# Patient Record
Sex: MALE | URBAN - METROPOLITAN AREA
[De-identification: names, ages, dates, MRNs, and addresses within clinical notes are randomized per-mention and may not be internally consistent; named-entity substitution may affect disease eponyms.]

---

## 2023-03-16 ENCOUNTER — TELEPHONE (OUTPATIENT)
Dept: NEPHROLOGY | Facility: CLINIC | Age: 83
End: 2023-03-16

## 2023-03-16 NOTE — TELEPHONE ENCOUNTER
New Patient Intake Form   Patient Details   Sivan No     1940     [id number]     Insurance Information   Name of [address]   Does the patient need an insurance referral? no   If patient has Isidoro Diaz, please ask if they will be using their Isidoro Diaz  Appointment Information   Who is calling to schedule? If not patient, what is callers name? Patient   Referring Provider  Dr Craig Don   Reason for Appt (Diagnosis) Cyst on each kidney   Does Patient have labs/urine done at [facility]? If not, where do they go? List the date of last lab / urine No [facility]  Per patient, Sometime last week     Has patient been hospitalized recently? If yes, list name and location of hospital they were in no   Has patient been seen by a Nephrologist before? If yes, list name, location and phone number no   Has the patient had renal imaging done? If so, list the most recent date and type of imaging yes a year ago   Does patient have a history of Kidney Stones?  No but had them in the past   Appointment Details   Is there a referral on file? no    Appointment Date 6/1/23   Location  [place]   Miscellaneous

## 2023-05-03 ENCOUNTER — TELEPHONE (OUTPATIENT)
Dept: NEPHROLOGY | Facility: CLINIC | Age: 83
End: 2023-05-03

## 2023-06-07 RX ORDER — ATORVASTATIN CALCIUM 80 MG/1
1 TABLET, FILM COATED ORAL EVERY EVENING
COMMUNITY
Start: 2023-01-27

## 2023-06-07 RX ORDER — PREDNISONE 10 MG/1
TABLET ORAL
COMMUNITY
Start: 2023-06-02

## 2023-06-07 RX ORDER — LEVOTHYROXINE SODIUM 0.1 MG/1
TABLET ORAL
COMMUNITY
Start: 2023-05-19

## 2023-06-07 RX ORDER — PENICILLIN V POTASSIUM 500 MG/1
TABLET ORAL
COMMUNITY
Start: 2023-06-02

## 2023-06-08 ENCOUNTER — TELEPHONE (OUTPATIENT)
Dept: UROLOGY | Facility: MEDICAL CENTER | Age: 83
End: 2023-06-08

## 2023-06-15 ENCOUNTER — OFFICE VISIT (OUTPATIENT)
Dept: UROLOGY | Facility: MEDICAL CENTER | Age: 83
End: 2023-06-15
Payer: MEDICARE

## 2023-06-15 VITALS
WEIGHT: 198 LBS | BODY MASS INDEX: 28.35 KG/M2 | SYSTOLIC BLOOD PRESSURE: 120 MMHG | OXYGEN SATURATION: 94 % | HEART RATE: 80 BPM | DIASTOLIC BLOOD PRESSURE: 80 MMHG | HEIGHT: 70 IN

## 2023-06-15 DIAGNOSIS — N28.1 RENAL CYST: Primary | ICD-10-CM

## 2023-06-15 PROBLEM — I63.9 CEREBROVASCULAR ACCIDENT (HCC): Status: ACTIVE | Noted: 2020-08-22

## 2023-06-15 PROBLEM — J44.9 CHRONIC OBSTRUCTIVE PULMONARY DISEASE (HCC): Status: ACTIVE | Noted: 2023-03-08

## 2023-06-15 PROBLEM — I48.91 ATRIAL FIBRILLATION (HCC): Status: ACTIVE | Noted: 2023-06-15

## 2023-06-15 PROBLEM — C34.90 NON-SMALL CELL LUNG CANCER (HCC): Status: ACTIVE | Noted: 2023-05-31

## 2023-06-15 PROBLEM — Z79.01 LONG TERM CURRENT USE OF ANTICOAGULANT THERAPY: Status: ACTIVE | Noted: 2018-10-03

## 2023-06-15 PROBLEM — M35.3 POLYMYALGIA RHEUMATICA (HCC): Status: ACTIVE | Noted: 2023-05-31

## 2023-06-15 PROBLEM — N17.9 ACUTE-ON-CHRONIC RENAL FAILURE (HCC): Status: ACTIVE | Noted: 2017-04-06

## 2023-06-15 PROBLEM — N18.9 ACUTE-ON-CHRONIC RENAL FAILURE (HCC): Status: ACTIVE | Noted: 2017-04-06

## 2023-06-15 LAB
SL AMB  POCT GLUCOSE, UA: ABNORMAL
SL AMB LEUKOCYTE ESTERASE,UA: ABNORMAL
SL AMB POCT BILIRUBIN,UA: ABNORMAL
SL AMB POCT BLOOD,UA: ABNORMAL
SL AMB POCT CLARITY,UA: CLEAR
SL AMB POCT COLOR,UA: YELLOW
SL AMB POCT KETONES,UA: ABNORMAL
SL AMB POCT NITRITE,UA: ABNORMAL
SL AMB POCT PH,UA: 5.5
SL AMB POCT SPECIFIC GRAVITY,UA: 1.02
SL AMB POCT URINE PROTEIN: ABNORMAL
SL AMB POCT UROBILINOGEN: 0.2

## 2023-06-15 PROCEDURE — 81003 URINALYSIS AUTO W/O SCOPE: CPT | Performed by: UROLOGY

## 2023-06-15 PROCEDURE — 99203 OFFICE O/P NEW LOW 30 MIN: CPT | Performed by: UROLOGY

## 2023-06-15 RX ORDER — DILTIAZEM HYDROCHLORIDE 180 MG/1
CAPSULE, COATED, EXTENDED RELEASE ORAL
COMMUNITY
Start: 2023-06-09

## 2023-06-15 NOTE — PROGRESS NOTES
"   HISTORY:    Consultation for bilateral renal cysts    Imaging done for follow-up lung cancer    Recent imaging shows multiple renal cysts, largest is 5 2 cm right upper pole kidney  Right kidney has a pelvic location  Left kidney has 13 mm cyst     Review of imaging back as far as 2018 shows similar findings, bilateral cyst that appears simple             ASSESSMENT / PLAN:    The simple cysts have grown very minimal over the years  No suggestion of cancer  I reassured him they are quite normal  and do not need treatment    No further imaging needed, follow-up as needed    The following portions of the patient's history were reviewed and updated as appropriate: allergies, current medications, past family history, past medical history, past social history, past surgical history and problem list     Review of Systems   All other systems reviewed and are negative  Objective:     Physical Exam  Genitourinary:     Comments: Abdomen soft nontender          No results found for: \"PSA\"]  No results found for: \"BUN\"  No results found for: \"CREATININE\"  No components found for: \"CBC\"      There is no problem list on file for this patient  Diagnoses and all orders for this visit:    Renal cyst  -     POCT urine dip auto non-scope    Other orders  -     diltiazem (CARDIZEM CD) 180 mg 24 hr capsule           Patient ID: Tiffany Orozco is a 82 y o  male        Current Outpatient Medications:   •  apixaban (ELIQUIS) 5 mg, Take 5 mg by mouth 2 (two) times a day, Disp: , Rfl:   •  atorvastatin (LIPITOR) 80 mg tablet, Take 1 tablet by mouth every evening, Disp: , Rfl:   •  diltiazem (CARDIZEM CD) 180 mg 24 hr capsule, , Disp: , Rfl:   •  levothyroxine 100 mcg tablet, , Disp: , Rfl:   •  penicillin V potassium (VEETID) 500 mg tablet, TAKE 1 TABLET BY MOUTH TWICE DAILY FOR 10 DAYS, Disp: , Rfl:   •  predniSONE 10 mg tablet, TAKE 4 TABLETS BY MOUTH ONCE DAILY FOR 2 DAYS, THEN TAKE 3 TABLETS FOR 2 DAYS, THEN TAKE 2 " TABLETS FOR 2 DAYS, THEN TAKE 1 TABLET FOR 2 DAYS, Disp: , Rfl:     Past Medical History:   Diagnosis Date   • Hypercholesteremia    • Hypertension        Past Surgical History:   Procedure Laterality Date   • BACK SURGERY     • SHOULDER DEBRIDEMENT Right        Social History